# Patient Record
Sex: MALE | Race: WHITE | NOT HISPANIC OR LATINO | URBAN - METROPOLITAN AREA
[De-identification: names, ages, dates, MRNs, and addresses within clinical notes are randomized per-mention and may not be internally consistent; named-entity substitution may affect disease eponyms.]

---

## 2017-01-09 ENCOUNTER — APPOINTMENT (RX ONLY)
Dept: URBAN - METROPOLITAN AREA CLINIC 39 | Facility: CLINIC | Age: 42
Setting detail: DERMATOLOGY
End: 2017-01-09

## 2017-01-09 DIAGNOSIS — L40.0 PSORIASIS VULGARIS: ICD-10-CM

## 2017-01-09 PROCEDURE — ? TREATMENT REGIMEN

## 2017-01-09 PROCEDURE — ? PRESCRIPTION

## 2017-01-09 PROCEDURE — ? EDUCATIONAL RESOURCES PROVIDED

## 2017-01-09 PROCEDURE — ? COUNSELING

## 2017-01-09 PROCEDURE — 99213 OFFICE O/P EST LOW 20 MIN: CPT

## 2017-01-09 RX ORDER — BETAMETHASONE DIPROPIONATE 0.5 MG/G
LOTION TOPICAL
Qty: 1 | Refills: 5 | Status: ERX | COMMUNITY
Start: 2017-01-09

## 2017-01-09 RX ADMIN — BETAMETHASONE DIPROPIONATE: 0.5 LOTION TOPICAL at 00:00

## 2017-01-09 ASSESSMENT — LOCATION SIMPLE DESCRIPTION DERM: LOCATION SIMPLE: POSTERIOR SCALP

## 2017-01-09 ASSESSMENT — LOCATION ZONE DERM: LOCATION ZONE: SCALP

## 2017-01-09 ASSESSMENT — LOCATION DETAILED DESCRIPTION DERM: LOCATION DETAILED: RIGHT OCCIPITAL SCALP

## 2017-01-09 NOTE — PROCEDURE: TREATMENT REGIMEN
Detail Level: Zone
Action 3: Continue
Other Instructions: Patient will review Cosentyx with Rheumatologist and follow up PRN. Pt ruled out Otezla due to pre-existing longstanding bowel symptoms. Declare Humira allergy

## 2017-09-14 RX ORDER — BETAMETHASONE DIPROPIONATE 0.5 MG/G
LOTION TOPICAL
Qty: 1 | Refills: 5 | Status: ERX